# Patient Record
Sex: FEMALE | Race: WHITE
[De-identification: names, ages, dates, MRNs, and addresses within clinical notes are randomized per-mention and may not be internally consistent; named-entity substitution may affect disease eponyms.]

---

## 2018-04-05 ENCOUNTER — HOSPITAL ENCOUNTER (EMERGENCY)
Dept: HOSPITAL 47 - EC | Age: 18
Discharge: HOME | End: 2018-04-05
Payer: COMMERCIAL

## 2018-04-05 VITALS
RESPIRATION RATE: 20 BRPM | TEMPERATURE: 98.7 F | HEART RATE: 102 BPM | SYSTOLIC BLOOD PRESSURE: 132 MMHG | DIASTOLIC BLOOD PRESSURE: 56 MMHG

## 2018-04-05 DIAGNOSIS — F17.200: ICD-10-CM

## 2018-04-05 DIAGNOSIS — N61.0: Primary | ICD-10-CM

## 2018-04-05 PROCEDURE — 99283 EMERGENCY DEPT VISIT LOW MDM: CPT

## 2018-04-05 NOTE — ED
General Adult HPI





- General


Chief complaint: Skin/Abscess/Foreign Body


Stated complaint: Suspected infection


Time Seen by Provider: 04/05/18 14:09


Source: patient, RN notes reviewed


Mode of arrival: ambulatory


Limitations: no limitations





- History of Present Illness


Initial comments: 





17-year-old female presents to the emergency department for a chief complaint 

of left breast pain.  Patient states she had her nipples pierced about 2 weeks 

ago.  She started to have pain in her left breast last night.  Today she 

noticed her breast was red and tender.  She states the redness has been 

spreading since this morning.  Patient denies any streaking redness. Patient 

denies any pain in the arm. She states she patient denies any fevers or chills 

at home.  Patient denies any discharge from the nipple or breast.  Patient has 

not had any antibiotics for this condition.  





- Related Data


 Previous Rx's











 Medication  Instructions  Recorded


 


Amoxicillin/Potassium Clav 1 tab PO Q12HR 10 Days  tab 04/05/18





[Augmentin Xr 1,000-62.5 Tab]  











 Allergies











Allergy/AdvReac Type Severity Reaction Status Date / Time


 


No Known Allergies Allergy   Verified 04/05/18 13:30














Review of Systems


ROS Statement: 


Those systems with pertinent positive or pertinent negative responses have been 

documented in the HPI.





ROS Other: All systems not noted in ROS Statement are negative.





Past Medical History


Past Medical History: No Reported History


History of Any Multi-Drug Resistant Organisms: None Reported


Past Surgical History: No Surgical Hx Reported


Past Psychological History: No Psychological Hx Reported


Smoking Status: Current every day smoker


Past Alcohol Use History: Occasional


Past Drug Use History: None Reported





General Exam


Limitations: no limitations


Eye exam: Present: normal appearance, PERRL, EOMI.  Absent: scleral icterus, 

conjunctival injection, periorbital swelling


ENT exam: Present: normal exam, mucous membranes moist


Neck exam: Present: normal inspection.  Absent: tenderness, meningismus, 

lymphadenopathy


Respiratory exam: Present: normal lung sounds bilaterally.  Absent: respiratory 

distress, wheezes, rales, rhonchi, stridor


Cardiovascular Exam: Present: regular rate, normal rhythm, normal heart sounds.

  Absent: systolic murmur, diastolic murmur, rubs, gallop, clicks


Skin exam: Absent: normal color (Patient has redness to the left breast with 

slight induration.  No abscess visible.  No draining visible.  No streaking 

redness.  Patient has bilateral nipple piercings.)





Course


 Vital Signs











  04/05/18 04/05/18





  13:30 14:45


 


Temperature 98.6 F 98.7 F


 


Pulse Rate 120 H 102 H


 


Respiratory 18 20





Rate  


 


Blood Pressure 138/81 132/56


 


O2 Sat by Pulse 97 99





Oximetry  














Medical Decision Making





- Medical Decision Making





17-year-old female presents to the emergency department for a chief complaint 

of left breast pain.  Patient had her nipples pierced 2 weeks ago.  She started 

to notice pain in the left breast yesterday and she noticed redness this 

morning.  She states the redness has spread slightly since this morning.  

Patient denies any fevers or chills at home.  Patient denies any drainage from 

the nipple.  On exam left breast is erythematous and slightly indurated.  No 

abscess noted.  No drainage noted.  Patient is afebrile.  Patient will be 

started on Augmentin 1000 mg twice a day.  She was given a dose in the 

emergency department to start the process.  She will follow up with general 

surgery in 1-2 days.  Patient was told to drop black line around the redness in 

monitor if it spreads pass this point.  She is to return to the emergency 

Department if the redness continues to spread, if she develops fevers, or 

symptoms worsen at all.





Disposition


Clinical Impression: 


 Cellulitis





Disposition: HOME SELF-CARE


Condition: Good


Instructions:  Cellulitis (ED)


Additional Instructions: 


Please take antibiotics as directed.  Please return to the emergency department 

if you have worsening symptoms, spreading redness, or have fevers.  Please 

follow-up with general surgery in 1-2 days.


Prescriptions: 


Amoxicillin/Potassium Clav [Augmentin Xr 1,000-62.5 Tab] 1 tab PO Q12HR 10 Days

  tab


Referrals: 


None,Stated [Primary Care Provider] - 1-2 days


Leslie Albert DO [Doctor of Osteopathic Medicine] - 1-2 days


Time of Disposition: 14:34

## 2018-12-31 ENCOUNTER — HOSPITAL ENCOUNTER (EMERGENCY)
Dept: HOSPITAL 47 - EC | Age: 18
Discharge: HOME | End: 2018-12-31
Payer: COMMERCIAL

## 2018-12-31 VITALS
TEMPERATURE: 98.3 F | RESPIRATION RATE: 20 BRPM | DIASTOLIC BLOOD PRESSURE: 79 MMHG | HEART RATE: 95 BPM | SYSTOLIC BLOOD PRESSURE: 120 MMHG

## 2018-12-31 DIAGNOSIS — S91.012A: Primary | ICD-10-CM

## 2018-12-31 DIAGNOSIS — Z23: ICD-10-CM

## 2018-12-31 DIAGNOSIS — F17.200: ICD-10-CM
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
## 2018-12-31 PROCEDURE — 12001 RPR S/N/AX/GEN/TRNK 2.5CM/<: CPT

## 2018-12-31 PROCEDURE — 99282 EMERGENCY DEPT VISIT SF MDM: CPT

## 2018-12-31 PROCEDURE — 90471 IMMUNIZATION ADMIN: CPT

## 2018-12-31 PROCEDURE — 90715 TDAP VACCINE 7 YRS/> IM: CPT

## 2018-12-31 NOTE — ED
Wound/Laceration HPI





- General


Source: patient, RN notes reviewed


Mode of arrival: ambulatory


Limitations: no limitations





<Lance Hairston - Last Filed: 12/31/18 03:03>





<Ciarra Longo - Last Filed: 01/04/19 02:18>





- General


Chief Complaint: Wound/Laceration


Stated Complaint: Laceration-[facility]


Time Seen by Provider: 12/31/18 02:43





- History of Present Illness


Initial Comments: 





18-year-old female presented emergency department for left ankle laceration.  

Patient states she was at work dropped a plate causing a laceration to her 

ankle.  She does not know when her last tetanus was.  She did thoroughly 

cleaned out with water, alcohol.  Patient states that the bleeding has stopped 

but she feels that she may needs stitches. (Lance Hairston)





- Related Data


 Home Medications











 Medication  Instructions  Recorded  Confirmed


 


No Known Home Medications  12/31/18 12/31/18











 Allergies











Allergy/AdvReac Type Severity Reaction Status Date / Time


 


No Known Allergies Allergy   Verified 04/05/18 13:30














Review of Systems


ROS Other: All systems not noted in ROS Statement are negative.





<Lance Hairston - Last Filed: 12/31/18 03:03>


ROS Other: All systems not noted in ROS Statement are negative.





<Ciarra Longo - Last Filed: 01/04/19 02:18>


ROS Statement: 


Those systems with pertinent positive or pertinent negative responses have been 

documented in the HPI.








Past Medical History


Past Medical History: No Reported History


History of Any Multi-Drug Resistant Organisms: None Reported


Past Surgical History: No Surgical Hx Reported


Past Psychological History: No Psychological Hx Reported


Smoking Status: Current every day smoker


Past Alcohol Use History: Occasional


Past Drug Use History: None Reported





<Lance Hairston - Last Filed: 12/31/18 03:03>





General Exam


Limitations: no limitations


General appearance: alert, in no apparent distress


Respiratory exam: Present: normal lung sounds bilaterally.  Absent: respiratory 

distress, wheezes, rales, rhonchi, stridor


Cardiovascular Exam: Present: regular rate, normal rhythm, normal heart sounds.

  Absent: systolic murmur, diastolic murmur, rubs, gallop, clicks


Extremities exam: Present: other (Left lateral malleoli region there is a 2 cm 

laceration no tendon involvement there is no active bleeding patient's full 

range of motion neurovascular intact)





<Lance Hairston - Last Filed: 12/31/18 03:03>





 Vital Signs











  12/31/18





  02:38


 


Temperature 98.3 F


 


Pulse Rate 95


 


Respiratory 20





Rate 


 


Blood Pressure 120/79


 


O2 Sat by Pulse 100





Oximetry 














Procedures





- Laceration


  ** Laceration #1


Consent Obtained: verbal consent


Time Out Performed: Yes


Indication: laceration


Site: lower extremity (Left lateral malleolor region)


Size (cm): 2


Description: linear


Depth: simple, single layer


Anesthetic Used: lidocaine 1%, without epi


Anesthesia Technique: local infiltration


Amount (mls): 4


Pre-repair: wound explored, irrigated extensively, deep structures intact


Type of Sutures: nylon


Size of Sutures: 4-0


Number of Sutures: 4


Technique: simple, interrupted


Patient Tolerated Procedure: well, no complications





<Lance Hairston - Last Filed: 12/31/18 03:03>





Medical Decision Making





<Lance Hairston - Last Filed: 12/31/18 03:03>





<Ciarra Longo - Last Filed: 01/04/19 02:18>





- Medical Decision Making





2-year-old presented for laceration.  This was thoroughly cleaned, closed with 

sutures.  Patient tolerated well.  Tetanus was updated wound care instructions 

were given, return parameters were given. (Lance Hairston)


I was available for consultation in the emergency department. The history and 

physical exam were done by the Midlevel Provider. Medical decision making was 

done by the Midlevel Provider.  The Midlevel Provider did not contact me for 

this patient's care. I was not directly involved in this patient's care. 


 (Ciarra Longo)





Disposition


Is patient prescribed a controlled substance at d/c from ED?: No


Time of Disposition: 03:06





<Lance Hairston - Last Filed: 12/31/18 03:03>





<Ciarra Longo - Last Filed: 01/04/19 02:18>


Clinical Impression: 


 Laceration of ankle





Disposition: HOME SELF-CARE


Condition: Stable


Instructions:  Care For Your Stitches (ED), Laceration (DC)


Additional Instructions: 


Please return to the Emergency Department if symptoms worsen or any other 

concerns..  Have sutures removed in 10 days.


Referrals: 


None,Stated [Primary Care Provider] - 1-2 days

## 2020-06-17 ENCOUNTER — HOSPITAL ENCOUNTER (EMERGENCY)
Dept: HOSPITAL 47 - EC | Age: 20
Discharge: HOME | End: 2020-06-17
Payer: COMMERCIAL

## 2020-06-17 VITALS
TEMPERATURE: 98 F | HEART RATE: 80 BPM | DIASTOLIC BLOOD PRESSURE: 86 MMHG | RESPIRATION RATE: 18 BRPM | SYSTOLIC BLOOD PRESSURE: 131 MMHG
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
## 2020-06-17 DIAGNOSIS — F17.200: ICD-10-CM

## 2020-06-17 DIAGNOSIS — S92.324A: Primary | ICD-10-CM

## 2020-06-17 PROCEDURE — 29515 APPLICATION SHORT LEG SPLINT: CPT

## 2020-06-17 PROCEDURE — 73610 X-RAY EXAM OF ANKLE: CPT

## 2020-06-17 PROCEDURE — 99284 EMERGENCY DEPT VISIT MOD MDM: CPT

## 2020-06-17 PROCEDURE — 96372 THER/PROPH/DIAG INJ SC/IM: CPT

## 2020-06-17 PROCEDURE — 73700 CT LOWER EXTREMITY W/O DYE: CPT

## 2020-06-17 PROCEDURE — 73630 X-RAY EXAM OF FOOT: CPT

## 2020-06-17 NOTE — ED
Lower Extremity Injury HPI





- General


Chief Complaint: Extremity Injury, Lower


Stated Complaint: R Foot Injury


Time Seen by Provider: 06/17/20 21:00


Source: patient


Mode of arrival: wheelchair


Limitations: no limitations





- History of Present Illness


Initial Comments: 


20-year-old female presenting today for chief complaint of right foot pain.  

Patient states that just prior to arrival she was a blunting over her parents 

ponder when she came to the eye and she could not source of down and she struck 

the tree extending her right foot.  Patient states she has midfoot pain and 

swelling.  She states she took 3 ibuprofen which seemed to help the pain she 

states she is able to wiggle her toes however this hurts.  Patient denies any 

numbness or loss of sensation to admits to swelling.  Denies any bruising on the

plantar aspect is very painful to weight-bear. Patient denies knee or hip pain. 

Denies injury to the head neck abdomen or chest. Patient denies additional 

complaints. Upon arrival patient appears uncomfortable but well.








- Related Data


                                Home Medications











 Medication  Instructions  Recorded  Confirmed


 


Ibuprofen [Motrin Ib] 600 mg PO ONCE PRN 06/17/20 06/17/20








                                  Previous Rx's











 Medication  Instructions  Recorded


 


HYDROcodone/APAP 7.5-325MG [Norco 1 tab PO Q4H PRN 3 Days #18 tab 06/17/20





7.5-325]  











                                    Allergies











Allergy/AdvReac Type Severity Reaction Status Date / Time


 


No Known Allergies Allergy   Verified 06/17/20 21:24














Review of Systems


ROS Statement: 


Those systems with pertinent positive or pertinent negative responses have been 

documented in the HPI.





ROS Other: All systems not noted in ROS Statement are negative.





Past Medical History


Past Medical History: No Reported History


History of Any Multi-Drug Resistant Organisms: None Reported


Past Surgical History: No Surgical Hx Reported


Past Psychological History: No Psychological Hx Reported


Smoking Status: Current every day smoker


Past Alcohol Use History: Occasional


Past Drug Use History: None Reported





General Exam





- General Exam Comments


Initial Comments: 


General:  The patient is awake and alert, in no distress, and does not appear 

acutely ill. 


Eye:  Pupils are equal, round and reactive to light, extra-ocular movements are 

intact.  No nystagmus.  There is normal conjunctiva bilaterally.  No signs of 

icterus.  


Musculoskeletal: E's swollen foot at the mid to forefoot.  Some bruising 

developing.  Soft and compressible tissue.  No pain out of proportion.  Patient 

can wiggle all 5 toes but with significant pain. No bruising plantar aspect. 

Normal ROM, no tenderness.  Strength 5/5. Sensation intact proximal and distal 

to injury site. DP Radial pulses equal bilaterally 2+. Capillary refill < 3 

seconds.


Neurological:  A&O x 3. CN II-XII intact grossly, There are no obvious motor or 

sensory deficits. Coordination appears grossly intact. Speech is normal.


Skin:  Skin is warm and dry and no rashes or lesions are noted. 


Psychiatric:  Cooperative, appropriate mood & affect, normal judgment.  





Limitations: no limitations





Course


                                   Vital Signs











  06/17/20





  20:55


 


Temperature 98 F


 


Pulse Rate 80


 


Respiratory 18





Rate 


 


Blood Pressure 131/86


 


O2 Sat by Pulse 100





Oximetry 














Medical Decision Making





- Medical Decision Making


21yo female presenting for foot injury. XR concerning for Lis Franc which was 

demonstrated on the CT. Patient is neurovascularly intact. Patient tissues 

compressible. Consulted Dr. Woods reading CT report. Described PE. Recommended 

bulky grace, non weight bearing, pain control and elevation with follow-up in 

office within the next 2-3 days. Patient placed in a splint short leg right, 

bulky Grace prepadded synthetic.  She was neurovascularly both prior to and 

after splinting.  Discussed case attending provider Dr. Gastelum reviewed imaging 

studies is agreeable to care for discharge at this time.








Disposition


Clinical Impression: 


 Lisfranc fracture, Foot pain, Right foot pain





Disposition: HOME SELF-CARE


Condition: Good


Instructions (If sedation given, give patient instructions):  Foot Fracture in 

Adults (ED), Foot Sprain (ED)


Additional Instructions: 


Please use medication as discussed.  Please follow-up with orthopedics in the 

next 2 days, ABSOLUTELY NO WEIGHT BEARING ON THE RIGHT FOOT, use crutches and 

keep splint in place until further orthopedic recommendations.  Please return to

emergency room if the symptoms increase or worsen or for any other concerns.


Prescriptions: 


HYDROcodone/APAP 7.5-325MG [Norco 7.5-325] 1 tab PO Q4H PRN 3 Days #18 tab


 PRN Reason: Severe Pain


Is patient prescribed a controlled substance at d/c from ED?: No


Referrals: 


None,Stated [Primary Care Provider] - 1-2 days


Salbador Woods MD [STAFF PHYSICIAN] - 1-2 days


Time of Disposition: 22:53

## 2020-06-17 NOTE — XR
EXAMINATION TYPE: XR ankle complete RT, XR foot complete RT

 

DATE OF EXAM: 6/17/2020

 

CLINICAL HISTORY: Pain.

 

TECHNIQUE:  Frontal, lateral and oblique images of the right ankle and foot are obtained.

 

COMPARISON: None.

 

FINDINGS:  There is no acute fracture/dislocation evident in the right ankle.  The ankle mortise appe
ars within normal limits.  The overlying soft tissue appears unremarkable.

 

On oblique image there is acute nondisplaced oblique fracture through the proximal two thirds of the 
second metacarpal.  The joint spaces in the right foot are preserved.  Overlying soft tissue is unrem
arkable.  

 

IMPRESSION:  There is acute nondisplaced oblique fracture through proximal two thirds of the second m
etacarpal suspicious for acute stress fracture.

## 2020-06-17 NOTE — CT
EXAMINATION TYPE: CT foot RT wo con

 

DATE OF EXAM: 6/17/2020

 

COMPARISON: Same day right foot and ankle x-rays

 

HISTORY: Right foot pain and swelling.

 

CT DLP: 276.6 mGycm

Automated exposure control for dose reduction was used.

 

FINDINGS: 

Corresponding to x-ray there is acute oblique minimally displaced fracture through the proximal two t
hirds of the second metatarsal with extension to the proximal Lisfranc joint. There is lateral transl
ation of base of second metatarsal relative to the middle cuneiform with comminuted fracture componen
t showing some small fracture fragments. No significant dorsal or plantar displacement on sagittal im
ages. No acute fracture in the base of first and third metatarsals.

 

There is varus positioning and flexion of distal third through fifth toes. Muscle bulk preserved. Hin
dfoot articulations maintained. Overlying soft tissue unremarkable.

 

IMPRESSION: Confirmation of acute nondisplaced fracture proximal two thirds of second metatarsal with
 Lisfranc joint extension and comminuted component with few tiny fracture fragments and lateral separ
ation second metatarsal from middle cuneiform.

## 2020-08-22 ENCOUNTER — HOSPITAL ENCOUNTER (EMERGENCY)
Dept: HOSPITAL 47 - EC | Age: 20
Discharge: HOME | End: 2020-08-22
Payer: COMMERCIAL

## 2020-08-22 VITALS
SYSTOLIC BLOOD PRESSURE: 128 MMHG | RESPIRATION RATE: 16 BRPM | TEMPERATURE: 97.8 F | DIASTOLIC BLOOD PRESSURE: 73 MMHG | HEART RATE: 117 BPM

## 2020-08-22 DIAGNOSIS — F17.200: ICD-10-CM

## 2020-08-22 DIAGNOSIS — Z47.89: Primary | ICD-10-CM

## 2020-08-22 PROCEDURE — 99282 EMERGENCY DEPT VISIT SF MDM: CPT

## 2020-08-22 PROCEDURE — 29515 APPLICATION SHORT LEG SPLINT: CPT

## 2020-08-22 NOTE — ED
Extremity Problem HPI





- General


Chief complaint: Extremity Problem,Nontraumatic


Stated complaint: R Heel Cast Issues


Time Seen by Provider: 08/22/20 21:27


Source: patient


Mode of arrival: ambulatory


Limitations: no limitations





- History of Present Illness


Initial comments: 





Patient is a 20-year-old female presenting to emergency Department with a chief 

complaint of a wet cast.  Patient states she's had a cast in the right lower 

extremity for about 3 weeks now.  Patient states she is set to see her 

orthopedic specialist in 3 days.  States today she was in the pool and got the 

cast wet.  Patient states her skin is getting slightly and she was concerned for

an infection.  She denies any fever or chills at home.  States this occurred 

about one hour prior to arrival.  Denies any pain at the cast. 





- Related Data


                                Home Medications











 Medication  Instructions  Recorded  Confirmed


 


Ibuprofen [Motrin Ib] 600 mg PO ONCE PRN 06/17/20 06/17/20








                                  Previous Rx's











 Medication  Instructions  Recorded


 


HYDROcodone/APAP 7.5-325MG [Norco 1 tab PO Q4H PRN 3 Days #18 tab 06/17/20





7.5-325]  











                                    Allergies











Allergy/AdvReac Type Severity Reaction Status Date / Time


 


No Known Allergies Allergy   Verified 08/22/20 21:22














Review of Systems


ROS Statement: 


Those systems with pertinent positive or pertinent negative responses have been 

documented in the HPI.





ROS Other: All systems not noted in ROS Statement are negative.





Past Medical History


Past Medical History: No Reported History


History of Any Multi-Drug Resistant Organisms: None Reported


Past Surgical History: Orthopedic Surgery


Additional Past Surgical History / Comment(s): right foot


Past Psychological History: No Psychological Hx Reported


Smoking Status: Current every day smoker


Past Alcohol Use History: Occasional


Past Drug Use History: None Reported





General Exam


Limitations: no limitations


General appearance: alert, in no apparent distress


Head exam: Present: atraumatic, normocephalic, normal inspection


Eye exam: Present: normal appearance, PERRL, EOMI


Pupils: Present: normal accommodation


ENT exam: Present: normal exam, normal oropharynx, mucous membranes moist


Neck exam: Present: normal inspection, full ROM.  Absent: tenderness


Respiratory exam: Present: normal lung sounds bilaterally.  Absent: respiratory 

distress, wheezes


Cardiovascular Exam: Present: regular rate, normal rhythm, normal heart sounds


Extremities exam: Present: full ROM, normal capillary refill, other (+2 dorsalis

pedis and posterior tibialis after removal of cast).  Absent: normal inspection 

(wet cast in the right lower extremity.), tenderness


Back exam: Present: normal inspection, full ROM


Neurological exam: Present: alert, oriented X3


Psychiatric exam: Present: normal affect, normal mood


Skin exam: Present: warm, dry, intact, normal color





Course


                                   Vital Signs











  08/22/20 08/22/20





  21:16 22:26


 


Temperature 97.8 F 97.8 F


 


Pulse Rate 117 H 117 H


 


Respiratory 16 16





Rate  


 


Blood Pressure 128/73 128/73


 


O2 Sat by Pulse 100 100





Oximetry  














Procedures





- Cast Removal


Reason for procedure: wet


Cut Saw used: Yes


Cast procedure: removal


Post Removal Neuro Exam: intact


Post Removal Vascular Exam: intact


Patient Tolerated Procedure: well, no complications





- Orthopedic Splinting/Casting


  ** Injury #1


Side: right


Lower Extremity Injury Location: short leg


Lower Extremity Immobilizer: posterior splint, Ace wrap, synthetic pre-padded 

splint





Medical Decision Making





- Medical Decision Making





Patient is a 18-year-old female presenting to the emergency department with a 

chief complaint of a wet cast.  I spoke with Dr.Kirk Sarabia who is an 

orthopedic specialist that was on call from [facility] orthopedics which is where

the patient is seen.  He advised me to cut the cast and apply posterior splint 

with extra padding and about.  Absolutely no weightbearing.  I removed the cast 

with a saw.  Patient tolerated procedure well.  She was otherwise 

neurovascularly intact, with no signs of infection..  Posterior splint applied 

with extra padding on the bottom.  Patient already has crutches at home and did 

not want any from here.  Return parameters were thoroughly discussed with 

patient is understanding and agreeable.  Case discussed with physician.





Disposition


Clinical Impression: 


 Orthopedic cast removal, Cast in place on lower extremity





Disposition: HOME SELF-CARE


Condition: Stable


Additional Instructions: 


Follow-up with the orthopedic doctor.  Return to emergency department if 

symptoms worsen.


Is patient prescribed a controlled substance at d/c from ED?: No


Referrals: 


None,Stated [Primary Care Provider] - 1-2 days


Time of Disposition: 22:22